# Patient Record
Sex: MALE | Race: WHITE | NOT HISPANIC OR LATINO | Employment: PART TIME | URBAN - METROPOLITAN AREA
[De-identification: names, ages, dates, MRNs, and addresses within clinical notes are randomized per-mention and may not be internally consistent; named-entity substitution may affect disease eponyms.]

---

## 2021-04-14 ENCOUNTER — OFFICE VISIT (OUTPATIENT)
Dept: NURSING | Facility: CLINIC | Age: 36
End: 2021-04-14
Payer: COMMERCIAL

## 2021-04-14 PROCEDURE — 0001A PR COVID VAC PFIZER DIL RECON 30 MCG/0.3 ML IM: CPT

## 2021-04-14 PROCEDURE — 91300 PR COVID VAC PFIZER DIL RECON 30 MCG/0.3 ML IM: CPT

## 2021-05-02 ENCOUNTER — HEALTH MAINTENANCE LETTER (OUTPATIENT)
Age: 36
End: 2021-05-02

## 2021-05-05 ENCOUNTER — IMMUNIZATION (OUTPATIENT)
Dept: NURSING | Facility: CLINIC | Age: 36
End: 2021-05-05
Attending: INTERNAL MEDICINE
Payer: COMMERCIAL

## 2021-05-05 PROCEDURE — 0002A PR COVID VAC PFIZER DIL RECON 30 MCG/0.3 ML IM: CPT

## 2021-05-05 PROCEDURE — 91300 PR COVID VAC PFIZER DIL RECON 30 MCG/0.3 ML IM: CPT

## 2021-10-17 ENCOUNTER — HEALTH MAINTENANCE LETTER (OUTPATIENT)
Age: 36
End: 2021-10-17

## 2022-05-29 ENCOUNTER — HEALTH MAINTENANCE LETTER (OUTPATIENT)
Age: 37
End: 2022-05-29

## 2022-10-02 ENCOUNTER — HEALTH MAINTENANCE LETTER (OUTPATIENT)
Age: 37
End: 2022-10-02

## 2023-01-02 ENCOUNTER — OFFICE VISIT (OUTPATIENT)
Dept: FAMILY MEDICINE | Facility: CLINIC | Age: 38
End: 2023-01-02
Payer: COMMERCIAL

## 2023-01-02 VITALS
RESPIRATION RATE: 21 BRPM | DIASTOLIC BLOOD PRESSURE: 73 MMHG | OXYGEN SATURATION: 99 % | BODY MASS INDEX: 21.64 KG/M2 | SYSTOLIC BLOOD PRESSURE: 115 MMHG | HEART RATE: 73 BPM | HEIGHT: 75 IN | WEIGHT: 174 LBS

## 2023-01-02 DIAGNOSIS — F51.01 PRIMARY INSOMNIA: Primary | ICD-10-CM

## 2023-01-02 DIAGNOSIS — Z00.00 ROUTINE GENERAL MEDICAL EXAMINATION AT A HEALTH CARE FACILITY: ICD-10-CM

## 2023-01-02 PROCEDURE — 93010 ELECTROCARDIOGRAM REPORT: CPT | Performed by: INTERNAL MEDICINE

## 2023-01-02 PROCEDURE — 99385 PREV VISIT NEW AGE 18-39: CPT | Performed by: FAMILY MEDICINE

## 2023-01-02 PROCEDURE — 93005 ELECTROCARDIOGRAM TRACING: CPT | Performed by: FAMILY MEDICINE

## 2023-01-02 RX ORDER — AZITHROMYCIN 250 MG/1
TABLET, FILM COATED ORAL
COMMUNITY
Start: 2022-12-05 | End: 2023-01-02

## 2023-01-02 RX ORDER — TRAZODONE HYDROCHLORIDE 50 MG/1
50 TABLET, FILM COATED ORAL AT BEDTIME
Qty: 30 TABLET | Refills: 1 | Status: SHIPPED | OUTPATIENT
Start: 2023-01-02 | End: 2023-01-30

## 2023-01-02 ASSESSMENT — ENCOUNTER SYMPTOMS
DIZZINESS: 0
FREQUENCY: 0
JOINT SWELLING: 0
MYALGIAS: 0
DIARRHEA: 0
HEARTBURN: 0
NERVOUS/ANXIOUS: 1
SHORTNESS OF BREATH: 0
HEMATURIA: 0
HEADACHES: 0
PALPITATIONS: 0
FEVER: 0
DYSURIA: 0
HEMATOCHEZIA: 0
COUGH: 0
WEAKNESS: 0
SORE THROAT: 0
ABDOMINAL PAIN: 0
NAUSEA: 0
CHILLS: 0
ARTHRALGIAS: 0
PARESTHESIAS: 0
EYE PAIN: 0
CONSTIPATION: 0

## 2023-01-02 NOTE — PROGRESS NOTES
SUBJECTIVE:   CC: Ra is an 37 year old who presents for preventative health visit.   [facility] Maintenance Exam  [facility]  Phone : none    Assessment/Plan     1. Annual Wellness Visit as outlined below.   Health maintenance discussed as appropriate for age and risk factors including:  Nutrition, exercise, alcohol use, tobacco use, safe sexual practices, dental health.  Cancer screening assessed and ordered as indicated. Routine labs as outlined below based on age and risk factors reviewed today. Immunizations reviewed and updated.       Primary insomnia  Trial of trazodone 50mg for 1-2 weeks and increase as needed. Pt will send me message through [employer] regarding how he is doing and planning from there.  Continue sleep hygene and mental health cares.    - traZODone (DESYREL) 50 MG tablet  Dispense: 30 tablet; Refill: 1    Routine general medical examination at a health care facility  Check EKG due to significant family h/o CVD at a young age and current decrease in exercise capacity since moderate covid infection summer 2022.    - REVIEW OF HEALTH MAINTENANCE PROTOCOL ORDERS  - EKG 12-lead, tracing only       No follow-ups on file.    HPI:     Chief Complaint   Patient presents with     Physical       Shaun JENNIFER Francis is a 37 year old male and is here for a comprehensive health maintenance exam.     Other concerns today:   Mental health- sleep issues.  Can fall asleep easily but now having a hard time staying asleep.  Has used clonazepam in the past to fall asleep in his 20s. Goes to bed at 9:30/10pm and gets up at 6:45.  Wakes up 2-4am.  Sometimes will be awake for 10 minutes.  Has done a lot of therapy and self work.  SAD is part of the issue.      Headaches - possible occipital neuralgia.  Doing some massage base of skull neck and shoulders.      History summarized from1-2:reviewed last CPE 2020 in [place]  Old Records-1: Outside allergies, meds, problems and immunizations were  reconciled as needed from [facility]  Lab tests reviewed-1: 2020    Review of Systems   Complete ROS including General, HEENT, CV, Pulmonary, GI, , Genital, Neuro, Psych, MSK, Heme, Endocrine all within normal except as noted in the HPI or below as documented by patient.       Prevention of Osteoporosis:calcium in diet  Last Dexa:na    Cancer Screening:  Hemoccults/Colonoscopy: na  Lung Cancer: na      Wt Readings from Last 3 Encounters:   01/02/23 78.9 kg (174 lb)         Complete physical exam including:  General, HEENT, Neck, breast, back, CV, Pulmonary, Abdominal, extremities, skin, neuro, psych, lymph, genital exams all within normal.    Abnormalities include:  Grossly normal exam - pt is very tall and thin     Patient has been advised of split billing requirements and indicates understanding: Yes  Healthy Habits:     Getting at least 3 servings of Calcium per day:  Yes    Bi-annual eye exam:  NO    Dental care twice a year:  NO    Sleep apnea or symptoms of sleep apnea:  None    Diet:  Vegetarian/vegan    Frequency of exercise:  6-7 days/week    Duration of exercise:  Greater than 60 minutes    Taking medications regularly:  Yes    Medication side effects:  None    PHQ-2 Total Score: 2    Additional concerns today:  No    Today's PHQ-2 Score:   PHQ-2 ( 1999 Pfizer) 1/2/2023   Q1: Little interest or pleasure in doing things 1   Q2: Feeling down, depressed or hopeless 1   PHQ-2 Score 2   Q1: Little interest or pleasure in doing things Several days   Q2: Feeling down, depressed or hopeless Several days   PHQ-2 Score 2       Social History     Tobacco Use     Smoking status: Never     Smokeless tobacco: Never     Tobacco comments:     Never a smoker. Never will be.   Substance Use Topics     Alcohol use: Yes     Comment: I split 1 drink with my wife at most twice a week.       Alcohol Use 1/2/2023   Prescreen: >3 drinks/day or >7 drinks/week? No     Last PSA: No results found for: PSA    Reviewed orders with  "patient. Reviewed health maintenance and updated orders accordingly - Yes    Reviewed and updated as needed this visit by clinical staff   Tobacco  Allergies  Meds  Problems  Med Hx  Surg Hx  Fam Hx  Soc   Hx        Reviewed and updated as needed this visit by Provider   Tobacco  Allergies  Meds  Problems  Med Hx  Surg Hx  Fam Hx  Soc   Hx       Past Medical History:   Diagnosis Date     Depressive disorder 15    Was clinical but now just SAD that isn s so bad      Past Surgical History:   Procedure Laterality Date     ORTHOPEDIC SURGERY  2012ish    torn labrum in right shoulder       Review of Systems   Constitutional: Negative for chills and fever.   HENT: Negative for congestion, ear pain, hearing loss and sore throat.    Eyes: Negative for pain and visual disturbance.   Respiratory: Negative for cough and shortness of breath.    Cardiovascular: Negative for chest pain, palpitations and peripheral edema.   Gastrointestinal: Negative for abdominal pain, constipation, diarrhea, heartburn, hematochezia and nausea.   Genitourinary: Negative for dysuria, frequency, genital sores, hematuria, impotence, penile discharge and urgency.   Musculoskeletal: Negative for arthralgias, joint swelling and myalgias.   Skin: Negative for rash.   Neurological: Negative for dizziness, weakness, headaches and paresthesias.   Psychiatric/Behavioral: Negative for mood changes. The patient is nervous/anxious.      OBJECTIVE:   /73 (BP Location: Right arm, Patient Position: Sitting, Cuff Size: Adult Regular)   Pulse 73   Resp 21   Ht 1.91 m (6' 3.2\")   Wt 78.9 kg (174 lb)   SpO2 99%   BMI 21.63 kg/m      Physical Exam    ASSESSMENT/PLAN:     COUNSELING:     He reports that he has never smoked. He has never used smokeless tobacco.            Shameka Barnhart MD  [facility]  "

## 2023-01-09 ENCOUNTER — MYC MEDICAL ADVICE (OUTPATIENT)
Dept: FAMILY MEDICINE | Facility: CLINIC | Age: 38
End: 2023-01-09
Payer: COMMERCIAL

## 2023-01-09 DIAGNOSIS — Z82.49 FAMILY HISTORY OF CARDIAC ARREST: ICD-10-CM

## 2023-01-09 DIAGNOSIS — F51.01 PRIMARY INSOMNIA: ICD-10-CM

## 2023-01-09 DIAGNOSIS — R68.89 DECREASED EXERCISE TOLERANCE: Primary | ICD-10-CM

## 2023-01-09 DIAGNOSIS — R94.31 ABNORMAL ELECTROCARDIOGRAM: ICD-10-CM

## 2023-01-09 LAB
ATRIAL RATE - MUSE: 61 BPM
DIASTOLIC BLOOD PRESSURE - MUSE: NORMAL MMHG
INTERPRETATION ECG - MUSE: NORMAL
P AXIS - MUSE: 69 DEGREES
PR INTERVAL - MUSE: 140 MS
QRS DURATION - MUSE: 110 MS
QT - MUSE: 424 MS
QTC - MUSE: 426 MS
R AXIS - MUSE: 138 DEGREES
SYSTOLIC BLOOD PRESSURE - MUSE: NORMAL MMHG
T AXIS - MUSE: 47 DEGREES
VENTRICULAR RATE- MUSE: 61 BPM

## 2023-01-11 NOTE — TELEPHONE ENCOUNTER
RECORDS RECEIVED FROM: Abnormal electrocardiogram  Decreased exercise tolerance [R68.89]  Family history of cardiac arrest [Z82.49]   per patient- No device- Records in Epic   DATE RECEIVED: 2.27.23   NOTES STATUS DETAILS   OFFICE NOTE from referring provider    Internal 1.2.23 Dr Shameka Barnhart, [facility]   MEDICATION LIST   Internal    DIAGNOSTIC PROCEDURES     EKG   Internal 1.2.23

## 2023-01-30 RX ORDER — TRAZODONE HYDROCHLORIDE 100 MG/1
100 TABLET ORAL AT BEDTIME
Qty: 30 TABLET | Refills: 1 | Status: SHIPPED | OUTPATIENT
Start: 2023-01-30 | End: 2023-04-03

## 2023-02-27 ENCOUNTER — OFFICE VISIT (OUTPATIENT)
Dept: CARDIOLOGY | Facility: CLINIC | Age: 38
End: 2023-02-27
Attending: FAMILY MEDICINE
Payer: COMMERCIAL

## 2023-02-27 ENCOUNTER — PRE VISIT (OUTPATIENT)
Dept: CARDIOLOGY | Facility: CLINIC | Age: 38
End: 2023-02-27

## 2023-02-27 VITALS
OXYGEN SATURATION: 99 % | HEIGHT: 76 IN | BODY MASS INDEX: 21.72 KG/M2 | WEIGHT: 178.4 LBS | DIASTOLIC BLOOD PRESSURE: 82 MMHG | SYSTOLIC BLOOD PRESSURE: 154 MMHG | HEART RATE: 62 BPM

## 2023-02-27 DIAGNOSIS — R68.89 DECREASED EXERCISE TOLERANCE: ICD-10-CM

## 2023-02-27 DIAGNOSIS — R94.31 ABNORMAL ELECTROCARDIOGRAM: Primary | ICD-10-CM

## 2023-02-27 DIAGNOSIS — Z82.49 FAMILY HISTORY OF CARDIAC ARREST: ICD-10-CM

## 2023-02-27 PROCEDURE — G0463 HOSPITAL OUTPT CLINIC VISIT: HCPCS | Performed by: INTERNAL MEDICINE

## 2023-02-27 PROCEDURE — 99205 OFFICE O/P NEW HI 60 MIN: CPT | Mod: GC | Performed by: INTERNAL MEDICINE

## 2023-02-27 ASSESSMENT — PAIN SCALES - GENERAL: PAINLEVEL: NO PAIN (0)

## 2023-02-27 NOTE — PATIENT INSTRUCTIONS
Follow up Appointment Information:  Schedule for echocardiogram.  Dr Jones will review your results.    [address] [place] [address]      Thank you for allowing us to be a part of your care [facility] at [facility].      If you have questions or concerns please contact us at:  Cardiovascular Clinic  [facility]   [phone] Press #1 to send a message to your care team  On Call Cardiologist for after hours or on weekends: [phone]  option #4     *All co-payments are due at the time of services, if financial concerns are keeping you from attending scheduled clinic visits please contact our financial counselors at [phone] for further assistance.   * Please note that you will NOT receive a reminder call regarding your scheduled testing, reminder calls are for provider appointments only.  If you are scheduled for testing within the [facility] system you may receive a call regarding pre-registration for billing purposes only.**

## 2023-02-27 NOTE — NURSING NOTE
Chief Complaint   Patient presents with     New Patient     Abnormal ECG     Vitals were taken and medications reconciled.    Matt Aguilar, EMT  11:03 AM

## 2023-02-27 NOTE — LETTER
2023      RE: Shaun Blanco  [address]  [place] [address]       Dear Colleague,    Thank you for the opportunity to participate in the care of your patient, Shaun Blanco, at the [facility]. Please see a copy of my visit note below.    Chief complaint:     Decreased exercise intolerance, family history of premature CAD.    HPI:       Shaun Blanco is a 37 year old male with no significant past medical history was referred to the cardiology clinic for abnormal EKG. Patient had COVID-19 last year, and afterwards had decreased exercise tolerance.  He was seen by his primary care doctor who did an EKG and was referred to cardiology at that time. His post covid symptoms have completely resolved.  He exercises daily and has no limitations. He bikes to and from work, which is about 20 miles total. He denies any chest pain, dyspnea at rest or with exertion, PND, orthopnea, peripheral edema, palpitations, lightheadedness or syncope. He reports that his paternal grandfather  at the age of 39 of MI.    PAST MEDICAL HISTORY:  Past Medical History:   Diagnosis Date     Depressive disorder 15    Was clinical but now just SAD that isn s so bad     CURRENT MEDICATIONS:  Current Outpatient Medications   Medication Sig Dispense Refill     melatonin 5 MG CAPS        traZODone (DESYREL) 100 MG tablet Take 1 tablet (100 mg) by mouth At Bedtime 30 tablet 1     VALERIAN ROOT PO Take 500 mg by mouth daily       PAST SURGICAL HISTORY:  Past Surgical History:   Procedure Laterality Date     ORTHOPEDIC SURGERY  2012ish    torn labrum in right shoulder     ALLERGIES:     Allergies   Allergen Reactions     Foeniculum Vulgare Nausea and Vomiting     Tarragon,licorice     FAMILY HISTORY:  Family History   Problem Relation Age of Onset     Depression Mother         Clinical depressed forever     Melanoma Father      Spine Problems  "Father         has required spine surgery     Ovarian Cancer Paternal Grandmother      Heart Disease Paternal Grandfather          at 39 MI     SOCIAL HISTORY:  Social History     Tobacco Use     Smoking status: Never     Smokeless tobacco: Never     Tobacco comments:     Never a smoker. Never will be.   Substance Use Topics     Alcohol use: Yes     Comment: I split 1 drink with my wife at most twice a week.     Drug use: Yes     Types: Marijuana     Comment: Sometimes. Not dependant at all     ROS:   A comprehensive 14 point review of systems is negative other than as mentioned in HPI.    Exam:  BP (!) 154/82 (BP Location: Left arm, Patient Position: Chair, Cuff Size: Adult Regular)   Pulse 62   Ht 1.924 m (6' 3.75\")   Wt 80.9 kg (178 lb 6.4 oz)   SpO2 99%   BMI 21.86 kg/m    GENERAL APPEARANCE: healthy, alert and no distress  EYES: no icterus, no xanthelasmas  ENT: normal palate, mucosa moist, no central cyanosis  NECK: JVP not elevated  RESPIRATORY: lungs clear to auscultation - no rales, rhonchi or wheezes, no use of accessory muscles, no retractions, respirations are unlabored, normal respiratory rate  CARDIOVASCULAR: regular rhythm, normal S1 with physiologic split S2, no S3 or S4 and no murmur, click or rub.  GI: soft, non tender, bowel sounds normal,no abdominal bruits  EXTREMITIES: no edema, no bruits  NEURO: alert and oriented to person/place/time, normal speech, gait and affect  VASC: Radial, dorsalis pedis and posterior tibialis pulses 2+ bilaterally.  SKIN: no ecchymoses, no rashes.  PSYCH: cooperative, affect appropriate.     Testing/Procedures:    I personally reviewed all the following information:    EKG (2023): Normal sinus rhythm with sinus arrhythmia with heart rate of 61 bpm.  Possible left atrial enlargement.  Incomplete RBBB.    Assessment and Plan:     Shaun Blanco is a 37-year-old man with no significant past medical history was referred to the cardiology clinic for abnormal " EKG. His EKG showed normal sinus rhythm with sinus arrhythmia with heart rate of 61 bpm. Possible left atrial enlargement. Incomplete RBBB. This EKG can be of normal variant. He is asymptomatic, active and has no cardiac limitations. No prior history of syncope or presyncope. Given patient's reported family history of sudden cardiac death in his paternal grandfather at the age of 39, we will obtain an echocardiogram to evaluate his cardiac function. Further recommendations will be based on the echocardiogram.      Patient was seen and discussed with Dr. Jones.    Megha Guy MD  Cardiology fellow        [facility]    I reviewed the case, the labs, and tests, and I saw the patient. I discussed my findings and treatment recommendations with Dr. Guy and agree with the note. A total of 60 minutes were spent on the day of the visit for chart review, care coordination, face-to-face consultation with the patient, and documentation.    Merlin Jones MD      Addendum:    Mr. Blanco's echocardiogram was unremarkable and showed no evidence of a genetic cardiomyopathy.      Please do not hesitate to contact me if you have any questions/concerns.     Sincerely,     Merlin Jones MD

## 2023-02-27 NOTE — PROGRESS NOTES
Chief complaint:     Decreased exercise intolerance, family history of premature CAD.    HPI:       Shaun Blanco is a 37 year old male with no significant past medical history was referred to the cardiology clinic for abnormal EKG. Patient had COVID-19 last year, and afterwards had decreased exercise tolerance.  He was seen by his primary care doctor who did an EKG and was referred to cardiology at that time. His post covid symptoms have completely resolved.  He exercises daily and has no limitations. He bikes to and from work, which is about 20 miles total. He denies any chest pain, dyspnea at rest or with exertion, PND, orthopnea, peripheral edema, palpitations, lightheadedness or syncope. He reports that his paternal grandfather  at the age of 39 of MI.    PAST MEDICAL HISTORY:  Past Medical History:   Diagnosis Date     Depressive disorder 15    Was clinical but now just SAD that isn s so bad     CURRENT MEDICATIONS:  Current Outpatient Medications   Medication Sig Dispense Refill     melatonin 5 MG CAPS        traZODone (DESYREL) 100 MG tablet Take 1 tablet (100 mg) by mouth At Bedtime 30 tablet 1     VALERIAN ROOT PO Take 500 mg by mouth daily       PAST SURGICAL HISTORY:  Past Surgical History:   Procedure Laterality Date     ORTHOPEDIC SURGERY  2012ish    torn labrum in right shoulder     ALLERGIES:     Allergies   Allergen Reactions     Foeniculum Vulgare Nausea and Vomiting     Tarragon,licorice     FAMILY HISTORY:  Family History   Problem Relation Age of Onset     Depression Mother         Clinical depressed forever     Melanoma Father      Spine Problems Father         has required spine surgery     Ovarian Cancer Paternal Grandmother      Heart Disease Paternal Grandfather          at 39 MI     SOCIAL HISTORY:  Social History     Tobacco Use     Smoking status: Never     Smokeless tobacco: Never     Tobacco comments:     Never a smoker. Never will be.   Substance Use Topics     Alcohol use:  "Yes     Comment: I split 1 drink with my wife at most twice a week.     Drug use: Yes     Types: Marijuana     Comment: Sometimes. Not dependant at all     ROS:   A comprehensive 14 point review of systems is negative other than as mentioned in HPI.    Exam:  BP (!) 154/82 (BP Location: Left arm, Patient Position: Chair, Cuff Size: Adult Regular)   Pulse 62   Ht 1.924 m (6' 3.75\")   Wt 80.9 kg (178 lb 6.4 oz)   SpO2 99%   BMI 21.86 kg/m    GENERAL APPEARANCE: healthy, alert and no distress  EYES: no icterus, no xanthelasmas  ENT: normal palate, mucosa moist, no central cyanosis  NECK: JVP not elevated  RESPIRATORY: lungs clear to auscultation - no rales, rhonchi or wheezes, no use of accessory muscles, no retractions, respirations are unlabored, normal respiratory rate  CARDIOVASCULAR: regular rhythm, normal S1 with physiologic split S2, no S3 or S4 and no murmur, click or rub.  GI: soft, non tender, bowel sounds normal,no abdominal bruits  EXTREMITIES: no edema, no bruits  NEURO: alert and oriented to person/place/time, normal speech, gait and affect  VASC: Radial, dorsalis pedis and posterior tibialis pulses 2+ bilaterally.  SKIN: no ecchymoses, no rashes.  PSYCH: cooperative, affect appropriate.     Testing/Procedures:    I personally reviewed all the following information:    EKG (1/2/2023): Normal sinus rhythm with sinus arrhythmia with heart rate of 61 bpm.  Possible left atrial enlargement.  Incomplete RBBB.    Assessment and Plan:     Shaun Blanco is a 37-year-old man with no significant past medical history was referred to the cardiology clinic for abnormal EKG. His EKG showed normal sinus rhythm with sinus arrhythmia with heart rate of 61 bpm. Possible left atrial enlargement. Incomplete RBBB. This EKG can be of normal variant. He is asymptomatic, active and has no cardiac limitations. No prior history of syncope or presyncope. Given patient's reported family history of sudden cardiac death in his " paternal grandfather at the age of 39, we will obtain an echocardiogram to evaluate his cardiac function. Further recommendations will be based on the echocardiogram.      Patient was seen and discussed with Dr. Jones.    Megha Guy MD  Cardiology fellow        [facility]    I reviewed the case, the labs, and tests, and I saw the patient. I discussed my findings and treatment recommendations with Dr. Guy and agree with the note. A total of 60 minutes were spent on the day of the visit for chart review, care coordination, face-to-face consultation with the patient, and documentation.    Merlin Jones MD      Addendum:    Mr. Blanco's echocardiogram was unremarkable and showed no evidence of a genetic cardiomyopathy.

## 2023-03-13 ENCOUNTER — ANCILLARY PROCEDURE (OUTPATIENT)
Dept: CARDIOLOGY | Facility: CLINIC | Age: 38
End: 2023-03-13
Attending: INTERNAL MEDICINE
Payer: COMMERCIAL

## 2023-03-13 LAB — LVEF ECHO: NORMAL

## 2023-03-13 PROCEDURE — 93306 TTE W/DOPPLER COMPLETE: CPT | Performed by: STUDENT IN AN ORGANIZED HEALTH CARE EDUCATION/TRAINING PROGRAM

## 2023-04-01 ENCOUNTER — TELEPHONE (OUTPATIENT)
Dept: FAMILY MEDICINE | Facility: CLINIC | Age: 38
End: 2023-04-01
Payer: COMMERCIAL

## 2023-04-01 DIAGNOSIS — F51.01 PRIMARY INSOMNIA: ICD-10-CM

## 2023-04-01 NOTE — TELEPHONE ENCOUNTER
Medication Question or Refill        What medication are you calling about (include dose and sig)?: traZODone (DESYREL) 100 MG tablet    Preferred Pharmacy:   [facility] - [place], [place] - [address] [place] [address]  Phone: [phone] Fax: [phone]      Controlled Substance Agreement on file:   CSA -- Patient Level:    CSA: None found at the patient level.       Who prescribed the medication?: Shameka Barnhart    Do you need a refill? Yes    When did you use the medication last? 03/31/23    Patient offered an appointment? No    Do you have any questions or concerns?  Yes:       Could we send this information to you in [facility] or would you prefer to receive a phone call?:   Patient would prefer a phone call   Okay to leave a detailed message?: Yes at Cell number on file:    Telephone Information:   Mobile [phone]

## 2023-04-03 RX ORDER — TRAZODONE HYDROCHLORIDE 100 MG/1
100 TABLET ORAL AT BEDTIME
Qty: 30 TABLET | Refills: 4 | Status: SHIPPED | OUTPATIENT
Start: 2023-04-03 | End: 2023-05-27

## 2023-12-04 ENCOUNTER — PATIENT OUTREACH (OUTPATIENT)
Dept: CARE COORDINATION | Facility: CLINIC | Age: 38
End: 2023-12-04
Payer: COMMERCIAL

## 2023-12-18 ENCOUNTER — PATIENT OUTREACH (OUTPATIENT)
Dept: CARE COORDINATION | Facility: CLINIC | Age: 38
End: 2023-12-18
Payer: COMMERCIAL

## 2024-02-20 ENCOUNTER — OFFICE VISIT (OUTPATIENT)
Dept: FAMILY MEDICINE | Facility: CLINIC | Age: 39
End: 2024-02-20
Payer: COMMERCIAL

## 2024-02-20 VITALS
TEMPERATURE: 97.7 F | DIASTOLIC BLOOD PRESSURE: 82 MMHG | WEIGHT: 185 LBS | HEIGHT: 75 IN | SYSTOLIC BLOOD PRESSURE: 132 MMHG | RESPIRATION RATE: 20 BRPM | BODY MASS INDEX: 23 KG/M2 | HEART RATE: 62 BPM | OXYGEN SATURATION: 99 %

## 2024-02-20 DIAGNOSIS — F51.01 PRIMARY INSOMNIA: ICD-10-CM

## 2024-02-20 DIAGNOSIS — N45.1 EPIDIDYMITIS: Primary | ICD-10-CM

## 2024-02-20 PROCEDURE — 91320 SARSCV2 VAC 30MCG TRS-SUC IM: CPT | Performed by: FAMILY MEDICINE

## 2024-02-20 PROCEDURE — 87591 N.GONORRHOEAE DNA AMP PROB: CPT | Performed by: FAMILY MEDICINE

## 2024-02-20 PROCEDURE — 90471 IMMUNIZATION ADMIN: CPT | Performed by: FAMILY MEDICINE

## 2024-02-20 PROCEDURE — 99214 OFFICE O/P EST MOD 30 MIN: CPT | Mod: 25 | Performed by: FAMILY MEDICINE

## 2024-02-20 PROCEDURE — 87491 CHLMYD TRACH DNA AMP PROBE: CPT | Performed by: FAMILY MEDICINE

## 2024-02-20 PROCEDURE — 90746 HEPB VACCINE 3 DOSE ADULT IM: CPT | Performed by: FAMILY MEDICINE

## 2024-02-20 PROCEDURE — 90480 ADMN SARSCOV2 VAC 1/ONLY CMP: CPT | Performed by: FAMILY MEDICINE

## 2024-02-20 RX ORDER — DOXYCYCLINE 100 MG/1
100 CAPSULE ORAL 2 TIMES DAILY
Qty: 20 CAPSULE | Refills: 0 | Status: SHIPPED | OUTPATIENT
Start: 2024-02-20 | End: 2024-03-01

## 2024-02-20 RX ORDER — TRAZODONE HYDROCHLORIDE 100 MG/1
100 TABLET ORAL AT BEDTIME
Qty: 90 TABLET | Refills: 1 | Status: SHIPPED | OUTPATIENT
Start: 2024-02-20 | End: 2024-07-19

## 2024-02-20 NOTE — PROGRESS NOTES
Assessment & Plan     Epididymitis  Management discussed with patient, side effect of medication discussed, return or follow-up with primary care physician if not improve after completion of treatment.  - doxycycline hyclate (VIBRAMYCIN) 100 MG capsule; Take 1 capsule (100 mg) by mouth 2 times daily for 10 days  - UA Macroscopic with reflex to Microscopic and Culture; Future  - Chlamydia & Gonorrhea by PCR, [facility] - Clinic Collect; Future  - UA Macroscopic with reflex to Microscopic and Culture  - Chlamydia & Gonorrhea by PCR, [facility] Clinic Collect    Primary insomnia    - traZODone (DESYREL) 100 MG tablet; Take 1 tablet (100 mg) by mouth at bedtime    Review of external notes as documented elsewhere in note  30 minutes spent by me on the date of the encounter doing chart review, review of outside records, review of test results, interpretation of tests, patient visit, and documentation         Assessment and Plan:    Testicular pain  Right sided persistent intermittent ache most concerning for ependymitis. Absence of nausea, vomiting, hemodynamic instability, and the mild nature of the pain reassuring against testicular torsion or Brandie's gangrene.   -UA, Chlamydia   -Doxycycline 100 mg 2x daily for 10 days    Health maintenance  -Hep B vaccination  -Covid booster  -Declined flu vaccine      Subjective   Ra is a 38 year old, presenting for the following health issues:  Testicle sore (Right side. X 1.5 months.) and Refill Request        2/20/2024     9:50 AM   Additional Questions   Roomed by Sha SAMUEL   Accompanied by self     Ra is a 38 year old male with no significant PMH who presents to the clinic for a 2 month history of scrotal pain. He describes the pain as right sided, dull, and intermittent. It is worsened with pressure, such as when he lays on his right side, and relieved when he lays on his left side. He notices the pain is worse at night. It has been improving over the last two  "months, and is now present only some days. The pain is localized to the scrotum and does not radiate. He has had no nausea, vomiting, dysuria, or hematuria.No prior genitourinary surgeries.    History of Present Illness       Reason for visit:  Right testicle aching for weeks. Sometimes better, sometiems worse  Symptom onset:  More than a month  Symptoms include:  Right testicle ache  Symptom intensity:  Moderate  Symptom progression:  Staying the same  Had these symptoms before:  No  What makes it worse:  No  What makes it better:  No    He eats 4 or more servings of fruits and vegetables daily.He consumes 0 sweetened beverage(s) daily.He exercises with enough effort to increase his heart rate 30 to 60 minutes per day.  He exercises with enough effort to increase his heart rate 6 days per week. He is missing 1 dose(s) of medications per week.  He is not taking prescribed medications regularly due to other.           Review of Systems  Constitutional, HEENT, cardiovascular, pulmonary, gi and gu systems are negative, except as otherwise noted.      Objective    /82 (BP Location: Left arm, Patient Position: Sitting, Cuff Size: Adult Regular)   Pulse 62   Temp 97.7  F (36.5  C) (Temporal)   Resp 20   Ht 1.905 m (6' 3\")   Wt 83.9 kg (185 lb)   SpO2 99%   BMI 23.12 kg/m    Body mass index is 23.12 kg/m .  Physical Exam  Cardiovascular:      Rate and Rhythm: Regular rhythm.      Heart sounds: Normal heart sounds.   Pulmonary:      Effort: Pulmonary effort is normal.      Breath sounds: Normal breath sounds.   Genitourinary:     Comments: Right sided scrotal engorgement and tenderness to palpation. Phren's sign negative. No uneven testicular masses palpated bilaterally  Neurological:      Mental Status: He is alert.          No results found for any visits on 02/20/24.      I was present with the medical student (Ioana Nash, MS3) who participated in the service and in the documentation of the note. I have " verified the history and personally performed the physical exam and medical decision making. I agree with the assessment and plan of care as documented in the note above.    Ioana Nash, MS3  [employer] [facility] [employer]      Signed Electronically by: Ajit Dockery MD  Prior to immunization administration, verified patients identity using patient s name and date of birth. Please see Immunization Activity for additional information.     Screening Questionnaire for Adult Immunization    Are you sick today?   No   Do you have allergies to medications, food, a vaccine component or latex?   Yes   Have you ever had a serious reaction after receiving a vaccination?   No   Do you have a long-term health problem with heart, lung, kidney, or metabolic disease (e.g., diabetes), asthma, a blood disorder, no spleen, complement component deficiency, a cochlear implant, or a spinal fluid leak?  Are you on long-term aspirin therapy?   No   Do you have cancer, leukemia, HIV/AIDS, or any other immune system problem?   No   Do you have a parent, brother, or sister with an immune system problem?   No   In the past 3 months, have you taken medications that affect  your immune system, such as prednisone, other steroids, or anticancer drugs; drugs for the treatment of rheumatoid arthritis, Crohn s disease, or psoriasis; or have you had radiation treatments?   No   Have you had a seizure, or a brain or other nervous system problem?   No   During the past year, have you received a transfusion of blood or blood    products, or been given immune (gamma) globulin or antiviral drug?   No   For women: Are you pregnant or is there a chance you could become       pregnant during the next month?   NA   Have you received any vaccinations in the past 4 weeks?   No     Immunization questionnaire was positive for at least one answer.  Notified Dr. Dockery.      Patient instructed to remain in clinic for 15 minutes afterwards, and to report  any adverse reactions.     Screening performed by Sha Estevez MA on 2/20/2024 at 9:53 AM.

## 2024-02-21 LAB
C TRACH DNA SPEC QL PROBE+SIG AMP: NEGATIVE
N GONORRHOEA DNA SPEC QL NAA+PROBE: NEGATIVE

## 2024-03-09 ENCOUNTER — HEALTH MAINTENANCE LETTER (OUTPATIENT)
Age: 39
End: 2024-03-09

## 2024-06-30 ENCOUNTER — PATIENT OUTREACH (OUTPATIENT)
Dept: CARE COORDINATION | Facility: CLINIC | Age: 39
End: 2024-06-30
Payer: COMMERCIAL

## 2024-07-19 DIAGNOSIS — F51.01 PRIMARY INSOMNIA: ICD-10-CM

## 2024-07-19 RX ORDER — TRAZODONE HYDROCHLORIDE 100 MG/1
100 TABLET ORAL AT BEDTIME
Qty: 90 TABLET | Refills: 1 | Status: SHIPPED | OUTPATIENT
Start: 2024-07-19

## 2024-08-19 DIAGNOSIS — F43.22 ADJUSTMENT DISORDER WITH ANXIOUS MOOD: Primary | ICD-10-CM

## 2024-10-09 ENCOUNTER — ALLIED HEALTH/NURSE VISIT (OUTPATIENT)
Dept: FAMILY MEDICINE | Facility: CLINIC | Age: 39
End: 2024-10-09
Payer: COMMERCIAL

## 2024-10-09 DIAGNOSIS — Z23 ENCOUNTER FOR IMMUNIZATION: Primary | ICD-10-CM

## 2024-10-09 PROCEDURE — 90471 IMMUNIZATION ADMIN: CPT

## 2024-10-09 PROCEDURE — 91320 SARSCV2 VAC 30MCG TRS-SUC IM: CPT

## 2024-10-09 PROCEDURE — 99207 PR NO CHARGE NURSE ONLY: CPT

## 2024-10-09 PROCEDURE — 90656 IIV3 VACC NO PRSV 0.5 ML IM: CPT

## 2024-10-09 PROCEDURE — 90480 ADMN SARSCOV2 VAC 1/ONLY CMP: CPT

## 2024-10-09 NOTE — PROGRESS NOTES
Prior to immunization administration, verified patients identity using patient s name and date of birth. Please see Immunization Activity for additional information.     Screening Questionnaire for Adult Immunization    Are you sick today?   No   Do you have allergies to medications, food, a vaccine component or latex?   No   Have you ever had a serious reaction after receiving a vaccination?   No   Do you have a long-term health problem with heart, lung, kidney, or metabolic disease (e.g., diabetes), asthma, a blood disorder, no spleen, complement component deficiency, a cochlear implant, or a spinal fluid leak?  Are you on long-term aspirin therapy?   No   Do you have cancer, leukemia, HIV/AIDS, or any other immune system problem?   No   Do you have a parent, brother, or sister with an immune system problem?   No   In the past 3 months, have you taken medications that affect  your immune system, such as prednisone, other steroids, or anticancer drugs; drugs for the treatment of rheumatoid arthritis, Crohn s disease, or psoriasis; or have you had radiation treatments?   No   Have you had a seizure, or a brain or other nervous system problem?   No   During the past year, have you received a transfusion of blood or blood    products, or been given immune (gamma) globulin or antiviral drug?   No   For women: Are you pregnant or is there a chance you could become       pregnant during the next month?   No   Have you received any vaccinations in the past 4 weeks?   No     Immunization questionnaire answers were all negative.    I have reviewed the following standing orders:   This patient is due and qualifies for the Covid-19 vaccine.     Click here for COVID-19 Standing Order    I have reviewed the vaccines inclusion and exclusion criteria; No concerns regarding eligibility.     This patient is due and qualifies for the Influenza vaccine.    Click here for Influenza Vaccine Standing Order    I have reviewed the  vaccines inclusion and exclusion criteria; No concerns regarding eligibility.     Patient instructed to remain in clinic for 15 minutes afterwards, and to report any adverse reactions.     Screening performed by Sha Estevez MA on 10/9/2024 at 2:06 PM.

## 2025-02-27 DIAGNOSIS — F51.01 PRIMARY INSOMNIA: ICD-10-CM

## 2025-02-27 RX ORDER — TRAZODONE HYDROCHLORIDE 100 MG/1
100 TABLET ORAL AT BEDTIME
Qty: 90 TABLET | Refills: 4 | Status: SHIPPED | OUTPATIENT
Start: 2025-02-27

## 2025-03-05 DIAGNOSIS — F51.01 PRIMARY INSOMNIA: ICD-10-CM

## 2025-03-05 RX ORDER — TRAZODONE HYDROCHLORIDE 100 MG/1
100 TABLET ORAL AT BEDTIME
Qty: 90 TABLET | Refills: 2 | OUTPATIENT
Start: 2025-03-05

## 2025-03-16 ENCOUNTER — HEALTH MAINTENANCE LETTER (OUTPATIENT)
Age: 40
End: 2025-03-16

## 2025-04-14 ENCOUNTER — NURSE TRIAGE (OUTPATIENT)
Dept: FAMILY MEDICINE | Facility: CLINIC | Age: 40
End: 2025-04-14
Payer: COMMERCIAL

## 2025-04-15 ENCOUNTER — OFFICE VISIT (OUTPATIENT)
Dept: FAMILY MEDICINE | Facility: CLINIC | Age: 40
End: 2025-04-15
Payer: COMMERCIAL

## 2025-04-15 VITALS
BODY MASS INDEX: 21.88 KG/M2 | DIASTOLIC BLOOD PRESSURE: 68 MMHG | HEIGHT: 75 IN | OXYGEN SATURATION: 98 % | SYSTOLIC BLOOD PRESSURE: 112 MMHG | WEIGHT: 176 LBS | TEMPERATURE: 97 F | RESPIRATION RATE: 20 BRPM | HEART RATE: 66 BPM

## 2025-04-15 DIAGNOSIS — M25.511 RIGHT SHOULDER PAIN, UNSPECIFIED CHRONICITY: Primary | ICD-10-CM

## 2025-04-15 DIAGNOSIS — S43.431S TEAR OF RIGHT GLENOID LABRUM, SEQUELA: ICD-10-CM

## 2025-04-15 PROCEDURE — 3074F SYST BP LT 130 MM HG: CPT | Performed by: FAMILY MEDICINE

## 2025-04-15 PROCEDURE — 99213 OFFICE O/P EST LOW 20 MIN: CPT | Performed by: FAMILY MEDICINE

## 2025-04-15 PROCEDURE — 3078F DIAST BP <80 MM HG: CPT | Performed by: FAMILY MEDICINE

## 2025-04-15 NOTE — PROGRESS NOTES
"  Assessment & Plan     Right shoulder pain, unspecified chronicity    - MR Shoulder Right w/o Contrast; Future    Tear of right glenoid labrum, sequela    - MR Shoulder Right w/o Contrast; Future    History of right labrum repair years ago, now having similar pain at the right shoulder, with limited range of motion, management discussed, will schedule an MRI of the right shoulder, further condition will be based on result.        Follow-up       Sara Knapp is a 39 year old, presenting for the following health issues:  Shoulder right (Pain for about week or so )      4/15/2025     2:56 PM   Additional Questions   Roomed by Sharonda     Shoulder right    History of Present Illness       Reason for visit:  Shoulder pain  Symptom onset:  3-7 days ago  Symptom intensity:  Moderate  Symptom progression:  Staying the same  Had these symptoms before:  Yes  Has tried/received treatment for these symptoms:  Yes  Previous treatment was successful:  Yes  Prior treatment description:  Surgery  What makes it worse:  Some movement   He is taking medications regularly.        Started experiencing pain at the right shoulder in early April after trying to lift off his child.  Pain has been persistent, exacerbated by some movement of his shoulder not fully relieved with rest.  He has a history of right labral repair years ago.  No numbness tingling to his right upper extremity.      Review of Systems  Constitutional, HEENT, cardiovascular, pulmonary, gi and gu systems are negative, except as otherwise noted.      Objective    /68 (BP Location: Left arm, Patient Position: Sitting, Cuff Size: Adult Regular)   Pulse 66   Temp 97  F (36.1  C) (Temporal)   Resp 20   Ht 1.905 m (6' 3\")   Wt 79.8 kg (176 lb)   SpO2 98%   BMI 22.00 kg/m    Body mass index is 22 kg/m .  Physical Exam   GENERAL: alert and no distress  NECK: no adenopathy, no asymmetry, masses, or scars  RESP: lungs clear to auscultation - no rales, " rhonchi or wheezes  CV: regular rate and rhythm, normal S1 S2, no S3 or S4, no murmur, click or rub, no peripheral edema  MS: RUE exam (shoulder) shows no swollen,Mild tenderness at the bicep tendon and AC joint area, with positive Hawking test.    This note was completed in part using a voice recognition software, any grammatical or context distortion are unintentional and inherent to the software.          Signed Electronically by: Ajit Dockery MD

## 2025-04-15 NOTE — TELEPHONE ENCOUNTER
"SEE IN OFFICE WITHIN 3 DAYS:   * You need to be examined.   * Scheduled appointment on 4/15/25 at 1440    Reason for Disposition   MODERATE pain (e.g., interferes with normal activities) and present > 3 days    Additional Information   Negative: Shock suspected (e.g., cold/pale/clammy skin, too weak to stand, low BP, rapid pulse)   Negative: Similar pain previously and it was from 'heart attack'   Negative: Similar pain previously and it was from 'angina' and not relieved by nitroglycerin   Negative: Sounds like a life-threatening emergency to the triager   Negative: Chest pain   Negative: Followed an injury to shoulder   Negative: Difficulty breathing or unusual sweating (e.g., sweating without exertion)   Negative: Pain lasting > 5 minutes and pain also present in chest  (Exception: Pain is clearly made worse by movement.)   Negative: Age > 40 and no obvious cause and pain even when not moving the arm  (Exception: Pain is clearly made worse by moving arm or bending neck.)    Answer Assessment - Initial Assessment Questions  1. ONSET: \"When did the pain start?\"      4/4 or 4/5  2. LOCATION: \"Where is the pain located?\"      Right shoulder  3. PAIN: \"How bad is the pain?\" (Scale 1-10; or mild, moderate, severe)      If not doing anything 1/2. If something happened to it it is 8/10  4. WORK OR EXERCISE: \"Has there been any recent work or exercise that involved this part of the body?\"      Playing baseball  5. CAUSE: \"What do you think is causing the shoulder pain?\"      Tendonitis. About 15 years ago I had a torn labrum and had it surgically repaired  6. OTHER SYMPTOMS: \"Do you have any other symptoms?\" (e.g., neck pain, swelling, rash, fever, numbness, weakness)      No  7. PREGNANCY: \"Is there any chance you are pregnant?\" \"When was your last menstrual period?\"      N/A    Protocols used: Shoulder Pain-A-OH    "

## 2025-04-23 ENCOUNTER — MYC MEDICAL ADVICE (OUTPATIENT)
Dept: FAMILY MEDICINE | Facility: CLINIC | Age: 40
End: 2025-04-23
Payer: COMMERCIAL

## 2025-04-23 DIAGNOSIS — M25.511 RIGHT SHOULDER PAIN, UNSPECIFIED CHRONICITY: Primary | ICD-10-CM

## 2025-04-28 ENCOUNTER — PATIENT OUTREACH (OUTPATIENT)
Dept: CARE COORDINATION | Facility: CLINIC | Age: 40
End: 2025-04-28
Payer: COMMERCIAL

## 2025-05-05 ENCOUNTER — ANCILLARY PROCEDURE (OUTPATIENT)
Dept: GENERAL RADIOLOGY | Facility: CLINIC | Age: 40
End: 2025-05-05
Attending: PEDIATRICS
Payer: COMMERCIAL

## 2025-05-05 ENCOUNTER — OFFICE VISIT (OUTPATIENT)
Dept: ORTHOPEDICS | Facility: CLINIC | Age: 40
End: 2025-05-05
Payer: COMMERCIAL

## 2025-05-05 DIAGNOSIS — M25.511 RIGHT SHOULDER PAIN, UNSPECIFIED CHRONICITY: ICD-10-CM

## 2025-05-05 DIAGNOSIS — Z98.890 S/P SHOULDER SURGERY: Primary | ICD-10-CM

## 2025-05-05 PROCEDURE — 99244 OFF/OP CNSLTJ NEW/EST MOD 40: CPT | Performed by: PEDIATRICS

## 2025-05-05 PROCEDURE — 73030 X-RAY EXAM OF SHOULDER: CPT | Mod: TC | Performed by: STUDENT IN AN ORGANIZED HEALTH CARE EDUCATION/TRAINING PROGRAM

## 2025-05-05 NOTE — LETTER
5/5/2025      Shaun Blanco  [address]  [place] [address]      Dear Colleague,    Thank you for referring your patient, Shaun Blanco, to the [facility]. Please see a copy of my visit note below.    ASSESSMENT & PLAN    Ra was seen today for pain.    Diagnoses and all orders for this visit:    S/P shoulder surgery  -     Cancel: Physical Therapy  Referral; Future  -     Physical Therapy  Referral; Future    Right shoulder pain, unspecified chronicity  -     Orthopedic  Referral  -     XR Shoulder Right G/E 3 Views; Future  -     Cancel: Physical Therapy  Referral; Future  -     Physical Therapy  Referral; Future      This issue is acute on chronic and Unchanged.      ICD-10-CM    1. S/P shoulder surgery  Z98.890 Physical Therapy  Referral     CANCELED: Physical Therapy  Referral      2. Right shoulder pain, unspecified chronicity  M25.511 Orthopedic  Referral     XR Shoulder Right G/E 3 Views     Physical Therapy  Referral     CANCELED: Physical Therapy  Referral        Patient Instructions   Low suspicion for rotator cuff tear given current history and exam.  History of labral injury, possibly contributing. Recommended rest from irritating activities coupled with physical therapy.  Would consider further imaging or treatment pending clinical course.      Plan:  - Today's Plan of Care:  Rehab: Physical Therapy: [facility] - [phone]    Discussed activity considerations and other supportive care including Ice/Heat, OTC and other topical medications as needed.    -We also discussed other future treatment options:  MRI or MR arthrogram if not improving    Follow Up: 6 - 8 weeks    Concerning signs and symptoms were reviewed and all questions were answered at this time.    Thanks for the opportunity to participate in the care of this patient, I will keep you  updated on their progress.    CC: Dr. Sarkis De La O MD [facility]  Sports Medicine Physician  [facility]      -----  Chief Complaint   Patient presents with     Right Shoulder - Pain       SUBJECTIVE  Shaun Blanco is a/an 39 year old male who is seen in consultation at the request of Dr. Dockery for evaluation of RIGHT shoulder pain.     The patient is seen by themselves.  The patient is Right handed    Onset: 1 month(s) ago. Patient describes injury as picking up his child, felt a sharp pain in the shoulder.  He had to grasp it.    Location of Pain: right shoulder; AC joint, Superior shoulder, somewhat down the bicep, some tightness in the posterior scapula   Worsened by: reaching back, internal rotation, throwing, overhead movements, not lifting in the perfect way   Better with: unsure   Treatments tried: ice, Tylenol, and ibuprofen  Associated symptoms: numbness, tingling, weakness of left shoulder, and sharp pains at times, mostly achy     Orthopedic/Surgical history: YES - Date: 12-15 years ago had labral surgery.  Social History/Occupation: likes to play baseball.       REVIEW OF SYSTEMS:  Review of Systems    OBJECTIVE:  There were no vitals taken for this visit.   General: healthy, alert and in no distress  Skin: no suspicious lesions or rash.  CV: distal perfusion intact   Resp: normal respiratory effort without conversational dyspnea   Psych: normal mood and affect  Gait: NORMAL  Neuro: Normal light sensory exam of upper extremity    Right Shoulder exam    Inspection and Posture:       normal    Skin:        no visible deformities    Tender:        subacromial space right    Non Tender:       remainder of shoulder right    ROM:        Full active and passive ROM with flexion, extension, abduction, internal and external rotation right       asymmetric scapular motion    Painful motions:       end range flexion and elevation right    Strength:        abduction 5/5 bilateral        flexion 5/5 bilateral       internal rotation 5/5 bilateral       external rotation 5/5 bilateral    Impingement testing:       neg (-) Neer right       positive (+) Jones right       positive (+) O'gaby right    Sensation:        normal sensation over shoulder and upper extremity      RADIOLOGY:  Final results and radiologist's interpretation, available in the [facility] health record.  Images were reviewed with the patient in the office today.  My personal interpretation of the performed imaging:    3 XR views of right shoulder reviewed: no acute bony abnormality, no significant degenerative change  - will follow official read    Reviewed PCP note  Review of the result(s) of each unique test - XR             Again, thank you for allowing me to participate in the care of your patient.        Sincerely,        Shameka De La O MD    Electronically signed

## 2025-05-05 NOTE — PATIENT INSTRUCTIONS
Low suspicion for rotator cuff tear given current history and exam.  History of labral injury, possibly contributing. Recommended rest from irritating activities coupled with physical therapy.  Would consider further imaging or treatment pending clinical course.      Plan:  - Today's Plan of Care:  Rehab: Physical Therapy: [facility] - [phone]    Discussed activity considerations and other supportive care including Ice/Heat, OTC and other topical medications as needed.    -We also discussed other future treatment options:  MRI or MR arthrogram if not improving    Follow Up: 6 - 8 weeks    If you have any further questions for your physician or physician s care team you can call [phone].     Dr. De La O will be out on an extended NATE for June, July, August & will be returning in September 2025.      If you are a patient requesting to be seen follow up, not specifically desiring/requesting an ultrasound guided procedure, you are free schedule with any of the Sports Medicine providers within the [facility] group.  A full list of our providers & their practice locations can be found at [facility].  Our central scheduling number is [phone] to assist in scheduling.  The below providers work directly with Dr. De La O at the [facility].    DO Michel Mckeon MD Christian Matthews, MD Scott Repa, DO      Please feel free to reach out to team directly with any other specific questions or concerns regarding your care.

## 2025-05-05 NOTE — PROGRESS NOTES
ASSESSMENT & PLAN    Ra was seen today for pain.    Diagnoses and all orders for this visit:    S/P shoulder surgery  -     Cancel: Physical Therapy  Referral; Future  -     Physical Therapy  Referral; Future    Right shoulder pain, unspecified chronicity  -     Orthopedic  Referral  -     XR Shoulder Right G/E 3 Views; Future  -     Cancel: Physical Therapy  Referral; Future  -     Physical Therapy  Referral; Future      This issue is acute on chronic and Unchanged.      ICD-10-CM    1. S/P shoulder surgery  Z98.890 Physical Therapy  Referral     CANCELED: Physical Therapy  Referral      2. Right shoulder pain, unspecified chronicity  M25.511 Orthopedic  Referral     XR Shoulder Right G/E 3 Views     Physical Therapy  Referral     CANCELED: Physical Therapy  Referral        Patient Instructions   Low suspicion for rotator cuff tear given current history and exam.  History of labral injury, possibly contributing. Recommended rest from irritating activities coupled with physical therapy.  Would consider further imaging or treatment pending clinical course.      Plan:  - Today's Plan of Care:  Rehab: Physical Therapy: [facility] - [phone]    Discussed activity considerations and other supportive care including Ice/Heat, OTC and other topical medications as needed.    -We also discussed other future treatment options:  MRI or MR arthrogram if not improving    Follow Up: 6 - 8 weeks    Concerning signs and symptoms were reviewed and all questions were answered at this time.    Thanks for the opportunity to participate in the care of this patient, I will keep you updated on their progress.    CC: Dr. Sakris De La O MD [facility]  Sports Medicine Physician  [facility]      -----  Chief Complaint   Patient presents with    Right Shoulder - Pain       SUBJECTIVE  Shaun Blanco is a/an 39 year  old male who is seen in consultation at the request of Dr. Dockery for evaluation of RIGHT shoulder pain.     The patient is seen by themselves.  The patient is Right handed    Onset: 1 month(s) ago. Patient describes injury as picking up his child, felt a sharp pain in the shoulder.  He had to grasp it.    Location of Pain: right shoulder; AC joint, Superior shoulder, somewhat down the bicep, some tightness in the posterior scapula   Worsened by: reaching back, internal rotation, throwing, overhead movements, not lifting in the perfect way   Better with: unsure   Treatments tried: ice, Tylenol, and ibuprofen  Associated symptoms: numbness, tingling, weakness of left shoulder, and sharp pains at times, mostly achy     Orthopedic/Surgical history: YES - Date: 12-15 years ago had labral surgery.  Social History/Occupation: likes to play baseball.       REVIEW OF SYSTEMS:  Review of Systems    OBJECTIVE:  There were no vitals taken for this visit.   General: healthy, alert and in no distress  Skin: no suspicious lesions or rash.  CV: distal perfusion intact   Resp: normal respiratory effort without conversational dyspnea   Psych: normal mood and affect  Gait: NORMAL  Neuro: Normal light sensory exam of upper extremity    Right Shoulder exam    Inspection and Posture:       normal    Skin:        no visible deformities    Tender:        subacromial space right    Non Tender:       remainder of shoulder right    ROM:        Full active and passive ROM with flexion, extension, abduction, internal and external rotation right       asymmetric scapular motion    Painful motions:       end range flexion and elevation right    Strength:        abduction 5/5 bilateral       flexion 5/5 bilateral       internal rotation 5/5 bilateral       external rotation 5/5 bilateral    Impingement testing:       neg (-) Neer right       positive (+) Jones right       positive (+) O'gaby right    Sensation:        normal sensation over  shoulder and upper extremity      RADIOLOGY:  Final results and radiologist's interpretation, available in the [facility] health record.  Images were reviewed with the patient in the office today.  My personal interpretation of the performed imaging:    3 XR views of right shoulder reviewed: no acute bony abnormality, no significant degenerative change  - will follow official read    Reviewed PCP note  Review of the result(s) of each unique test - XR

## 2025-06-02 ASSESSMENT — ACTIVITIES OF DAILY LIVING (ADL)
PLEASE_INDICATE_YOR_PRIMARY_REASON_FOR_REFERRAL_TO_THERAPY:: SHOULDER
REMOVING_SOMETHING_FROM_YOUR_BACK_POCKET: 0
PLACING_AN_OBJECT_ON_A_HIGH_SHELF: 2
WASHING_YOUR_HAIR?: 2
AT_ITS_WORST?: 8
PUTTING_ON_A_SHIRT_THAT_BUTTONS_DOWN_THE_FRONT: 0
REACHING_FOR_SOMETHING_ON_A_HIGH_SHELF: 3
TOUCHING_THE_BACK_OF_YOUR_NECK: 0
WASHING_YOUR_BACK: 4
CARRYING_A_HEAVY_OBJECT_OF_10_POUNDS: 3
PUSHING_WITH_THE_INVOLVED_ARM: 1
PUTTING_ON_YOUR_PANTS: 0
PUTTING_ON_AN_UNDERSHIRT_OR_A_PULLOVER_SWEATER: 1
WHEN_LYING_ON_THE_INVOLVED_SIDE: 1

## 2025-06-02 NOTE — PROGRESS NOTES
PHYSICAL THERAPY EVALUATION  Type of Visit: Evaluation        Fall Risk Screen:  Have you fallen 2 or more times in the past year?: No  Have you fallen and had an injury in the past year?: No    Subjective         Presenting condition or subjective complaint: Shoulder pain  Date of onset: 05/05/25 (PT referral date)    Relevant medical history:     Dates & types of surgery: SLAP lesion with tendon anchors    Prior diagnostic imaging/testing results: X-ray     Prior therapy history for the same diagnosis, illness or injury: No      Living Environment  Social support: With a significant other or spouse   Type of home: House   Stairs to enter the home: Yes 4 Is there a railing: Yes     Ramp: No   Stairs inside the home: Yes 14 Is there a railing: Yes     Help at home: None  Equipment owned:       Employment: No    Hobbies/Interests: Cycling, baseball, art, hiking/camping, reading    Patient goals for therapy: Throw. Exist without pain.    Shaun Blanco is a 39 year old male with a right shoulder condition. Mechanism of injury: Chronic intermittent right shoulder pain. History of right shoulder labral surgery around 2010. Active . History of thoracic outlet. Where: (home, work, MVA, community, recreation/sport, unknown, other): NA. Location of symptoms: lateral. Pain level on number scale: 0-7/10. Quality of pain: sharp. Associated symptoms: stiffness, weakness, numbness, tingling. Pain frequency (constant/intermittent): intermittent. Symptoms are exacerbated by: lying on side, throwing, reaching overhead, lifting, carrying. Symptoms are relieved by: ibuprofen, stretching. Progression of symptoms since onset (same/better/worse): same. Special tests (x-ray, MRI, CT scan, EMG, bone scan): x-ray.  Previous treatment: None. Improvement with previous treatment: NA. Pertinent medical history includes:  see Epic. Medical allergies includes: see Epic. Surgical history includes: see Epic. Current medications  include: see Epic. Occupation: None. Patient is (working in normal job without restrictions/working in normal job with restrictions/working in an alternate job/not working due to present treatment problem): NA. Primary job tasks: NA. Barriers at home/work: None reported by patient. Red flags: None reported by patient.     Objective   Posture    Forward Head +   Rounded Shoulders +   Scapular Winging      Shoulder AROM (* = pain) Right Left   FL WNL* WNL   ABD WNL* WNL   ER WNL* WNL   IR WNL* WNL     Shoulder Strength (* = pain) Right Left   FL 5-/5 5/5   ABD 5-/5 5/5   ER 5-/5 5/5   IR 5-/5 5/5   Biceps 5/5 5/5     Special Tests Right Left   Ana Cristina Fortune     Sunflower     Anterior Aprehension     Posterior Apprehension     Sulcus Sign     AC Crossover     Drop-Arm     Lift-off Sign     Painful Arc +      Cervical AROM grossly min loss throughout  Assessment & Plan   CLINICAL IMPRESSIONS  Medical Diagnosis: Right shoulder pain, unspecified chronicity, S/P shoulder surgery    Treatment Diagnosis: Right shoulder pain, unspecified chronicity, S/P shoulder surgery   Impression/Assessment: Patient is a 39 year old male with right shoulder complaints.  The following significant findings have been identified: Pain, Decreased ROM/flexibility, Decreased strength, Impaired muscle performance, Decreased activity tolerance, and Impaired posture. These impairments interfere with their ability to perform self care tasks, recreational activities, and household chores as compared to previous level of function.     Clinical Decision Making (Complexity):  Clinical Presentation: Stable/Uncomplicated  Clinical Presentation Rationale: based on medical and personal factors listed in PT evaluation  Clinical Decision Making (Complexity): Low complexity    PLAN OF CARE  Treatment Interventions:  Interventions: Manual Therapy, Neuromuscular Re-education, Therapeutic Activity, Therapeutic Exercise, Self-Care/Home Management    Long  Term Goals     PT Goal 1  Goal Description: Patient will have unrestricted reaching with 0/10 pain.  Rationale: to maximize safety and independence with performance of ADLs and functional tasks  Target Date: 08/28/25      Frequency of Treatment: 1x/week  Duration of Treatment: for 4 weeks tapering down to 2x/month for 8 weeks    Recommended Referrals to Other Professionals: None  Education Assessment:   Learner/Method: Patient;No Barriers to Learning    Risks and benefits of evaluation/treatment have been explained.   Patient/Family/caregiver agrees with Plan of Care.     Evaluation Time:     PT Eval, Low Complexity Minutes (08619): 15  Signing Clinician: Anival Vallecillo PT

## 2025-06-05 ENCOUNTER — THERAPY VISIT (OUTPATIENT)
Dept: PHYSICAL THERAPY | Facility: CLINIC | Age: 40
End: 2025-06-05
Attending: PEDIATRICS
Payer: COMMERCIAL

## 2025-06-05 DIAGNOSIS — Z98.890 S/P SHOULDER SURGERY: ICD-10-CM

## 2025-06-05 DIAGNOSIS — M25.511 RIGHT SHOULDER PAIN, UNSPECIFIED CHRONICITY: ICD-10-CM

## 2025-07-08 ENCOUNTER — THERAPY VISIT (OUTPATIENT)
Dept: PHYSICAL THERAPY | Facility: CLINIC | Age: 40
End: 2025-07-08
Payer: COMMERCIAL

## 2025-07-08 DIAGNOSIS — M25.511 RIGHT SHOULDER PAIN, UNSPECIFIED CHRONICITY: Primary | Chronic | ICD-10-CM

## 2025-07-08 DIAGNOSIS — Z98.890 S/P SHOULDER SURGERY: ICD-10-CM

## 2025-07-08 PROCEDURE — 97110 THERAPEUTIC EXERCISES: CPT | Mod: GP | Performed by: PHYSICAL THERAPIST

## 2025-07-08 PROCEDURE — 97112 NEUROMUSCULAR REEDUCATION: CPT | Mod: GP | Performed by: PHYSICAL THERAPIST

## 2025-07-16 ENCOUNTER — TELEPHONE (OUTPATIENT)
Dept: DERMATOLOGY | Facility: CLINIC | Age: 40
End: 2025-07-16
Payer: COMMERCIAL

## 2025-07-16 NOTE — TELEPHONE ENCOUNTER
Online Appointment Request      [facility] Call [facility]  Phone Message      Reason for Call: Appointment Intake     Patients Requests:    Reason for appointment - DERMATOLOGY  Preferred Visit Type - NOT LISTED ON ONLINE [facility] APPT REQUEST (WAS BLANK)          Action taken: Other: none; Documenting patient was called; detail message was left for patient to call back.

## 2025-07-23 ENCOUNTER — THERAPY VISIT (OUTPATIENT)
Dept: PHYSICAL THERAPY | Facility: CLINIC | Age: 40
End: 2025-07-23
Payer: COMMERCIAL

## 2025-07-23 DIAGNOSIS — M25.511 RIGHT SHOULDER PAIN, UNSPECIFIED CHRONICITY: Primary | Chronic | ICD-10-CM

## 2025-07-23 DIAGNOSIS — Z98.890 S/P SHOULDER SURGERY: ICD-10-CM

## 2025-07-23 PROCEDURE — 97112 NEUROMUSCULAR REEDUCATION: CPT | Mod: GP | Performed by: PHYSICAL THERAPIST

## 2025-07-23 PROCEDURE — 97110 THERAPEUTIC EXERCISES: CPT | Mod: GP | Performed by: PHYSICAL THERAPIST

## 2025-07-23 PROCEDURE — 97530 THERAPEUTIC ACTIVITIES: CPT | Mod: GP | Performed by: PHYSICAL THERAPIST
